# Patient Record
Sex: MALE | Race: WHITE | Employment: UNEMPLOYED | URBAN - METROPOLITAN AREA
[De-identification: names, ages, dates, MRNs, and addresses within clinical notes are randomized per-mention and may not be internally consistent; named-entity substitution may affect disease eponyms.]

---

## 2024-02-13 ENCOUNTER — HOSPITAL ENCOUNTER (EMERGENCY)
Age: 39
Discharge: HOME | End: 2024-02-14
Attending: EMERGENCY MEDICINE

## 2024-02-13 ENCOUNTER — APPOINTMENT (OUTPATIENT)
Dept: CT IMAGING | Age: 39
End: 2024-02-13

## 2024-02-13 ENCOUNTER — APPOINTMENT (OUTPATIENT)
Dept: ULTRASOUND IMAGING | Age: 39
End: 2024-02-13

## 2024-02-13 DIAGNOSIS — N45.1 EPIDIDYMITIS: Primary | ICD-10-CM

## 2024-02-13 LAB
ALBUMIN SERPL-MCNC: 4.5 G/DL (ref 3.5–5)
ALBUMIN/GLOB SERPL: 1.1 (ref 0.4–1.6)
ALP SERPL-CCNC: 87 U/L (ref 50–136)
ALT SERPL-CCNC: 25 U/L (ref 12–65)
ANION GAP SERPL CALC-SCNC: 9 MMOL/L (ref 2–11)
APPEARANCE UR: CLEAR
AST SERPL-CCNC: 19 U/L (ref 15–37)
BASOPHILS # BLD: 0 K/UL (ref 0–0.2)
BASOPHILS NFR BLD: 0 % (ref 0–2)
BILIRUB SERPL-MCNC: 1 MG/DL (ref 0.2–1.1)
BILIRUB UR QL: NEGATIVE
BUN SERPL-MCNC: 7 MG/DL (ref 6–23)
CALCIUM SERPL-MCNC: 10.1 MG/DL (ref 8.3–10.4)
CHLORIDE SERPL-SCNC: 103 MMOL/L (ref 103–113)
CO2 SERPL-SCNC: 19 MMOL/L (ref 21–32)
COLOR UR: NORMAL
CREAT SERPL-MCNC: 1.3 MG/DL (ref 0.8–1.5)
DIFFERENTIAL METHOD BLD: ABNORMAL
EOSINOPHIL # BLD: 0.2 K/UL (ref 0–0.8)
EOSINOPHIL NFR BLD: 1 % (ref 0.5–7.8)
ERYTHROCYTE [DISTWIDTH] IN BLOOD BY AUTOMATED COUNT: 13.3 % (ref 11.9–14.6)
GLOBULIN SER CALC-MCNC: 4.1 G/DL (ref 2.8–4.5)
GLUCOSE SERPL-MCNC: 121 MG/DL (ref 65–100)
GLUCOSE UR STRIP.AUTO-MCNC: NEGATIVE MG/DL
HCT VFR BLD AUTO: 52.7 % (ref 41.1–50.3)
HGB BLD-MCNC: 19.2 G/DL (ref 13.6–17.2)
HGB UR QL STRIP: NEGATIVE
IMM GRANULOCYTES # BLD AUTO: 0.1 K/UL (ref 0–0.5)
IMM GRANULOCYTES NFR BLD AUTO: 1 % (ref 0–5)
INR PPP: 0.9
KETONES UR QL STRIP.AUTO: NEGATIVE MG/DL
LACTATE SERPL-SCNC: 0.9 MMOL/L (ref 0.4–2)
LACTATE SERPL-SCNC: 3.6 MMOL/L (ref 0.4–2)
LEUKOCYTE ESTERASE UR QL STRIP.AUTO: NEGATIVE
LYMPHOCYTES # BLD: 2.1 K/UL (ref 0.5–4.6)
LYMPHOCYTES NFR BLD: 20 % (ref 13–44)
MCH RBC QN AUTO: 34 PG (ref 26.1–32.9)
MCHC RBC AUTO-ENTMCNC: 36.4 G/DL (ref 31.4–35)
MCV RBC AUTO: 93.4 FL (ref 82–102)
MONOCYTES # BLD: 0.6 K/UL (ref 0.1–1.3)
MONOCYTES NFR BLD: 6 % (ref 4–12)
NEUTS SEG # BLD: 7.6 K/UL (ref 1.7–8.2)
NEUTS SEG NFR BLD: 72 % (ref 43–78)
NITRITE UR QL STRIP.AUTO: NEGATIVE
NRBC # BLD: 0 K/UL (ref 0–0.2)
PH UR STRIP: 7 (ref 5–9)
PLATELET # BLD AUTO: 180 K/UL (ref 150–450)
PMV BLD AUTO: 10 FL (ref 9.4–12.3)
POTASSIUM SERPL-SCNC: 3.6 MMOL/L (ref 3.5–5.1)
PROT SERPL-MCNC: 8.6 G/DL (ref 6.3–8.2)
PROT UR STRIP-MCNC: NEGATIVE MG/DL
PROTHROMBIN TIME: 13 SEC (ref 11.3–14.9)
RBC # BLD AUTO: 5.64 M/UL (ref 4.23–5.6)
SODIUM SERPL-SCNC: 131 MMOL/L (ref 136–146)
SP GR UR REFRACTOMETRY: 1.02 (ref 1–1.02)
UROBILINOGEN UR QL STRIP.AUTO: 0.2 EU/DL (ref 0.2–1)
WBC # BLD AUTO: 10.6 K/UL (ref 4.3–11.1)

## 2024-02-13 PROCEDURE — 2580000003 HC RX 258: Performed by: EMERGENCY MEDICINE

## 2024-02-13 PROCEDURE — 74177 CT ABD & PELVIS W/CONTRAST: CPT

## 2024-02-13 PROCEDURE — 6360000004 HC RX CONTRAST MEDICATION: Performed by: EMERGENCY MEDICINE

## 2024-02-13 PROCEDURE — 76870 US EXAM SCROTUM: CPT

## 2024-02-13 PROCEDURE — 96374 THER/PROPH/DIAG INJ IV PUSH: CPT

## 2024-02-13 PROCEDURE — 96375 TX/PRO/DX INJ NEW DRUG ADDON: CPT

## 2024-02-13 PROCEDURE — 81003 URINALYSIS AUTO W/O SCOPE: CPT

## 2024-02-13 PROCEDURE — 87491 CHLMYD TRACH DNA AMP PROBE: CPT

## 2024-02-13 PROCEDURE — 80053 COMPREHEN METABOLIC PANEL: CPT

## 2024-02-13 PROCEDURE — 6370000000 HC RX 637 (ALT 250 FOR IP): Performed by: EMERGENCY MEDICINE

## 2024-02-13 PROCEDURE — 83605 ASSAY OF LACTIC ACID: CPT

## 2024-02-13 PROCEDURE — 87591 N.GONORRHOEAE DNA AMP PROB: CPT

## 2024-02-13 PROCEDURE — 85025 COMPLETE CBC W/AUTO DIFF WBC: CPT

## 2024-02-13 PROCEDURE — 99285 EMERGENCY DEPT VISIT HI MDM: CPT

## 2024-02-13 PROCEDURE — 6360000002 HC RX W HCPCS: Performed by: EMERGENCY MEDICINE

## 2024-02-13 PROCEDURE — 85610 PROTHROMBIN TIME: CPT

## 2024-02-13 RX ORDER — SODIUM CHLORIDE 0.9 % (FLUSH) 0.9 %
10 SYRINGE (ML) INJECTION
Status: DISCONTINUED | OUTPATIENT
Start: 2024-02-13 | End: 2024-02-14 | Stop reason: HOSPADM

## 2024-02-13 RX ORDER — DOXYCYCLINE HYCLATE 100 MG
100 TABLET ORAL 2 TIMES DAILY
Qty: 14 TABLET | Refills: 0 | Status: SHIPPED | OUTPATIENT
Start: 2024-02-13 | End: 2024-02-20

## 2024-02-13 RX ORDER — KETOROLAC TROMETHAMINE 30 MG/ML
30 INJECTION, SOLUTION INTRAMUSCULAR; INTRAVENOUS
Status: COMPLETED | OUTPATIENT
Start: 2024-02-13 | End: 2024-02-13

## 2024-02-13 RX ORDER — IBUPROFEN 800 MG/1
800 TABLET ORAL EVERY 8 HOURS PRN
Qty: 21 TABLET | Refills: 0 | Status: SHIPPED | OUTPATIENT
Start: 2024-02-13

## 2024-02-13 RX ORDER — ONDANSETRON 2 MG/ML
4 INJECTION INTRAMUSCULAR; INTRAVENOUS
Status: COMPLETED | OUTPATIENT
Start: 2024-02-13 | End: 2024-02-13

## 2024-02-13 RX ORDER — MORPHINE SULFATE 4 MG/ML
4 INJECTION, SOLUTION INTRAMUSCULAR; INTRAVENOUS
Status: COMPLETED | OUTPATIENT
Start: 2024-02-13 | End: 2024-02-13

## 2024-02-13 RX ORDER — DOXYCYCLINE HYCLATE 100 MG/1
100 CAPSULE ORAL ONCE
Status: COMPLETED | OUTPATIENT
Start: 2024-02-13 | End: 2024-02-13

## 2024-02-13 RX ORDER — TAMSULOSIN HYDROCHLORIDE 0.4 MG/1
0.4 CAPSULE ORAL
Status: COMPLETED | OUTPATIENT
Start: 2024-02-13 | End: 2024-02-13

## 2024-02-13 RX ORDER — 0.9 % SODIUM CHLORIDE 0.9 %
1000 INTRAVENOUS SOLUTION INTRAVENOUS
Status: COMPLETED | OUTPATIENT
Start: 2024-02-13 | End: 2024-02-13

## 2024-02-13 RX ORDER — 0.9 % SODIUM CHLORIDE 0.9 %
100 INTRAVENOUS SOLUTION INTRAVENOUS
Status: DISCONTINUED | OUTPATIENT
Start: 2024-02-13 | End: 2024-02-14 | Stop reason: HOSPADM

## 2024-02-13 RX ADMIN — KETOROLAC TROMETHAMINE 30 MG: 30 INJECTION, SOLUTION INTRAMUSCULAR; INTRAVENOUS at 19:50

## 2024-02-13 RX ADMIN — IOPAMIDOL 100 ML: 755 INJECTION, SOLUTION INTRAVENOUS at 21:03

## 2024-02-13 RX ADMIN — MORPHINE SULFATE 4 MG: 4 INJECTION INTRAVENOUS at 20:01

## 2024-02-13 RX ADMIN — TAMSULOSIN HYDROCHLORIDE 0.4 MG: 0.4 CAPSULE ORAL at 20:06

## 2024-02-13 RX ADMIN — SODIUM CHLORIDE 1000 ML: 9 INJECTION, SOLUTION INTRAVENOUS at 20:36

## 2024-02-13 RX ADMIN — DOXYCYCLINE HYCLATE 100 MG: 100 CAPSULE ORAL at 20:37

## 2024-02-13 RX ADMIN — ONDANSETRON 4 MG: 2 INJECTION INTRAMUSCULAR; INTRAVENOUS at 20:00

## 2024-02-13 NOTE — ED TRIAGE NOTES
Ambulatory to triage. States bilateral testicular pain that started two days ago. States awoke from nap and pain became severe. States pain 10/10.

## 2024-02-14 VITALS
OXYGEN SATURATION: 98 % | RESPIRATION RATE: 18 BRPM | TEMPERATURE: 98 F | SYSTOLIC BLOOD PRESSURE: 128 MMHG | WEIGHT: 165 LBS | DIASTOLIC BLOOD PRESSURE: 92 MMHG | BODY MASS INDEX: 23.1 KG/M2 | HEART RATE: 98 BPM | HEIGHT: 71 IN

## 2024-02-14 NOTE — ED PROVIDER NOTES
Emergency Department Provider Note       PCP: None, None   Age: 38 y.o.   Sex: male     DISPOSITION Decision To Discharge 02/13/2024 11:57:21 PM       ICD-10-CM    1. Epididymitis  N45.1           Medical Decision Making     Complexity of Problems Addressed:  1 or more acute illnesses that pose a threat to life or bodily function.     Data Reviewed and Analyzed:  I independently ordered and reviewed each unique test.  I reviewed external records: ED visit note from an outside group.     I interpreted the CT Scan CT scan negative for obvious pathology other than perhaps cystitis.  I interpreted the Ultrasound  ultrasound negative for testicular torsion does suggest epididymitis..    Discussion of management or test interpretation.  Patient with severe testicular pain and apparent epididymitis.  Rest of workup negative.  Prior history of opiate withdrawal noted.  Patient not concerned about a one-time dose in the ER.  He will go home with antibiotics and NSAIDs, hold off on any opiate prescriptions.    Risk of Complications and/or Morbidity of Patient Management:  Over the counter drug management performed.  Parental controlled substances given in the ED.  Patient was discharged risks and benefits of hospitalization were considered.  Shared medical decision making was utilized in creating the patients health plan today.        History       38-year-old gentleman presents to the ER with testicle and lower abdominal pain starting 2 days ago.  No fall or injury.  He describes testicular swelling and states he has had no previous history of hernias.  He reports some dysuria and some dribbling incontinence.  No history of urethral discharge.  He has had no fevers or chills or nausea or vomiting.  Pain worse when seated, patient actually diaphoretic at triage standing up leaning forward, while holding onto the back of a chair to tripod his torso.         Review of Systems   Constitutional:  Positive for diaphoresis.

## 2024-02-18 LAB
C TRACH RRNA SPEC QL NAA+PROBE: NEGATIVE
N GONORRHOEA RRNA SPEC QL NAA+PROBE: NEGATIVE
SPECIMEN SOURCE: NORMAL

## 2025-04-15 ENCOUNTER — HOSPITAL ENCOUNTER (INPATIENT)
Age: 40
LOS: 2 days | Discharge: HOME | DRG: 641 | End: 2025-04-17
Attending: INTERNAL MEDICINE | Admitting: INTERNAL MEDICINE

## 2025-04-15 ENCOUNTER — APPOINTMENT (OUTPATIENT)
Dept: ULTRASOUND IMAGING | Age: 40
DRG: 641 | End: 2025-04-15

## 2025-04-15 DIAGNOSIS — B35.6 TINEA CRURIS: ICD-10-CM

## 2025-04-15 DIAGNOSIS — E87.1 HYPONATREMIA: ICD-10-CM

## 2025-04-15 DIAGNOSIS — R36.1 BLOOD IN SEMEN: ICD-10-CM

## 2025-04-15 DIAGNOSIS — E87.29 ALCOHOLIC KETOACIDOSIS: Primary | ICD-10-CM

## 2025-04-15 DIAGNOSIS — I15.9 SECONDARY HYPERTENSION: ICD-10-CM

## 2025-04-15 PROBLEM — R74.8 ELEVATED LIVER ENZYMES: Status: ACTIVE | Noted: 2025-04-15

## 2025-04-15 PROBLEM — D75.1 POLYCYTHEMIA: Status: ACTIVE | Noted: 2025-04-15

## 2025-04-15 PROBLEM — I10 HYPERTENSION: Status: ACTIVE | Noted: 2025-04-15

## 2025-04-15 PROBLEM — E88.89 ALCOHOLIC KETOSIS (HCC): Status: ACTIVE | Noted: 2025-04-15

## 2025-04-15 PROBLEM — E16.2 HYPOGLYCEMIA: Status: ACTIVE | Noted: 2025-04-15

## 2025-04-15 PROBLEM — F10.90 ALCOHOL USE: Status: ACTIVE | Noted: 2025-04-15

## 2025-04-15 PROBLEM — Z72.0 TOBACCO USE: Status: ACTIVE | Noted: 2025-04-15

## 2025-04-15 LAB
ALBUMIN SERPL-MCNC: 4.7 G/DL (ref 3.5–5)
ALBUMIN/GLOB SERPL: 1.3 (ref 1–1.9)
ALP SERPL-CCNC: 75 U/L (ref 40–129)
ALT SERPL-CCNC: 93 U/L (ref 8–55)
AMPHET UR QL SCN: NEGATIVE
ANION GAP SERPL CALC-SCNC: 15 MMOL/L (ref 7–16)
ANION GAP SERPL CALC-SCNC: 23 MMOL/L (ref 7–16)
APPEARANCE UR: CLEAR
AST SERPL-CCNC: 114 U/L (ref 15–37)
BACTERIA URNS QL MICRO: NEGATIVE /HPF
BARBITURATES UR QL SCN: NEGATIVE
BASOPHILS # BLD: 0.04 K/UL (ref 0–0.2)
BASOPHILS NFR BLD: 0.6 % (ref 0–2)
BENZODIAZ UR QL: NEGATIVE
BILIRUB SERPL-MCNC: 0.9 MG/DL (ref 0–1.2)
BILIRUB UR QL: NEGATIVE
BUN SERPL-MCNC: 10 MG/DL (ref 6–23)
BUN SERPL-MCNC: 11 MG/DL (ref 6–23)
CALCIUM SERPL-MCNC: 8.9 MG/DL (ref 8.8–10.2)
CALCIUM SERPL-MCNC: 9.7 MG/DL (ref 8.8–10.2)
CANNABINOIDS UR QL SCN: POSITIVE
CHLORIDE SERPL-SCNC: 91 MMOL/L (ref 98–107)
CHLORIDE SERPL-SCNC: 97 MMOL/L (ref 98–107)
CO2 SERPL-SCNC: 15 MMOL/L (ref 20–29)
CO2 SERPL-SCNC: 19 MMOL/L (ref 20–29)
COCAINE UR QL SCN: NEGATIVE
COLOR UR: ABNORMAL
CREAT SERPL-MCNC: 0.99 MG/DL (ref 0.8–1.3)
CREAT SERPL-MCNC: 1.18 MG/DL (ref 0.8–1.3)
DIFFERENTIAL METHOD BLD: ABNORMAL
EOSINOPHIL # BLD: 0.03 K/UL (ref 0–0.8)
EOSINOPHIL NFR BLD: 0.4 % (ref 0.5–7.8)
EPI CELLS #/AREA URNS HPF: ABNORMAL /HPF
ERYTHROCYTE [DISTWIDTH] IN BLOOD BY AUTOMATED COUNT: 12.6 % (ref 11.9–14.6)
ETHANOL SERPL-MCNC: <11 MG/DL (ref 0–0.08)
GLOBULIN SER CALC-MCNC: 3.7 G/DL (ref 2.3–3.5)
GLUCOSE BLD STRIP.AUTO-MCNC: 130 MG/DL (ref 65–100)
GLUCOSE SERPL-MCNC: 117 MG/DL (ref 70–99)
GLUCOSE SERPL-MCNC: 69 MG/DL (ref 70–99)
GLUCOSE UR STRIP.AUTO-MCNC: NEGATIVE MG/DL
HAV IGM SER QL: NONREACTIVE
HBV CORE IGM SER QL: NONREACTIVE
HBV SURFACE AG SER QL: NONREACTIVE
HCT VFR BLD AUTO: 50.3 % (ref 41.1–50.3)
HCV AB SER QL: NONREACTIVE
HGB BLD-MCNC: 18.3 G/DL (ref 13.6–17.2)
HGB UR QL STRIP: NEGATIVE
HIV 1+2 AB+HIV1 P24 AG SERPL QL IA: NONREACTIVE
HIV 1/2 RESULT COMMENT: NORMAL
HYALINE CASTS URNS QL MICRO: ABNORMAL /LPF
IMM GRANULOCYTES # BLD AUTO: 0.04 K/UL (ref 0–0.5)
IMM GRANULOCYTES NFR BLD AUTO: 0.6 % (ref 0–5)
KETONES UR QL STRIP.AUTO: 40 MG/DL
LEUKOCYTE ESTERASE UR QL STRIP.AUTO: NEGATIVE
LYMPHOCYTES # BLD: 1.14 K/UL (ref 0.5–4.6)
LYMPHOCYTES NFR BLD: 15.7 % (ref 13–44)
MCH RBC QN AUTO: 34.7 PG (ref 26.1–32.9)
MCHC RBC AUTO-ENTMCNC: 36.4 G/DL (ref 31.4–35)
MCV RBC AUTO: 95.4 FL (ref 82–102)
METHADONE UR QL: NEGATIVE
MONOCYTES # BLD: 0.57 K/UL (ref 0.1–1.3)
MONOCYTES NFR BLD: 7.8 % (ref 4–12)
NEUTS SEG # BLD: 5.45 K/UL (ref 1.7–8.2)
NEUTS SEG NFR BLD: 74.9 % (ref 43–78)
NITRITE UR QL STRIP.AUTO: NEGATIVE
NRBC # BLD: 0 K/UL (ref 0–0.2)
OPIATES UR QL: NEGATIVE
OSMOLALITY SERPL: 276 MOSM/KG H2O (ref 275–295)
OSMOLALITY UR: 190 MOSM/KG H2O (ref 50–1400)
PCP UR QL: NEGATIVE
PH UR STRIP: 5.5 (ref 5–9)
PLATELET # BLD AUTO: 151 K/UL (ref 150–450)
PMV BLD AUTO: 9.7 FL (ref 9.4–12.3)
POTASSIUM SERPL-SCNC: 4.4 MMOL/L (ref 3.5–5.1)
POTASSIUM SERPL-SCNC: 4.6 MMOL/L (ref 3.5–5.1)
POTASSIUM SERPL-SCNC: ABNORMAL MMOL/L (ref 3.5–5.1)
PROT SERPL-MCNC: 8.5 G/DL (ref 6.3–8.2)
PROT UR STRIP-MCNC: 30 MG/DL
RBC # BLD AUTO: 5.27 M/UL (ref 4.23–5.6)
RBC #/AREA URNS HPF: ABNORMAL /HPF
SERVICE CMNT-IMP: ABNORMAL
SODIUM SERPL-SCNC: 128 MMOL/L (ref 136–145)
SODIUM SERPL-SCNC: 131 MMOL/L (ref 136–145)
SODIUM UR-SCNC: <20 MMOL/L
SP GR UR REFRACTOMETRY: 1.01 (ref 1–1.02)
TSH W FREE THYROID IF ABNORMAL: 1.56 UIU/ML (ref 0.27–4.2)
UROBILINOGEN UR QL STRIP.AUTO: 0.2 EU/DL (ref 0.2–1)
WBC # BLD AUTO: 7.3 K/UL (ref 4.3–11.1)
WBC URNS QL MICRO: ABNORMAL /HPF

## 2025-04-15 PROCEDURE — 36415 COLL VENOUS BLD VENIPUNCTURE: CPT

## 2025-04-15 PROCEDURE — 80074 ACUTE HEPATITIS PANEL: CPT

## 2025-04-15 PROCEDURE — 83935 ASSAY OF URINE OSMOLALITY: CPT

## 2025-04-15 PROCEDURE — 81001 URINALYSIS AUTO W/SCOPE: CPT

## 2025-04-15 PROCEDURE — 99285 EMERGENCY DEPT VISIT HI MDM: CPT

## 2025-04-15 PROCEDURE — 6370000000 HC RX 637 (ALT 250 FOR IP): Performed by: INTERNAL MEDICINE

## 2025-04-15 PROCEDURE — 84132 ASSAY OF SERUM POTASSIUM: CPT

## 2025-04-15 PROCEDURE — 2580000003 HC RX 258: Performed by: INTERNAL MEDICINE

## 2025-04-15 PROCEDURE — 82962 GLUCOSE BLOOD TEST: CPT

## 2025-04-15 PROCEDURE — 87389 HIV-1 AG W/HIV-1&-2 AB AG IA: CPT

## 2025-04-15 PROCEDURE — 80053 COMPREHEN METABOLIC PANEL: CPT

## 2025-04-15 PROCEDURE — 87491 CHLMYD TRACH DNA AMP PROBE: CPT

## 2025-04-15 PROCEDURE — 84443 ASSAY THYROID STIM HORMONE: CPT

## 2025-04-15 PROCEDURE — 80307 DRUG TEST PRSMV CHEM ANLYZR: CPT

## 2025-04-15 PROCEDURE — 85025 COMPLETE CBC W/AUTO DIFF WBC: CPT

## 2025-04-15 PROCEDURE — 93005 ELECTROCARDIOGRAM TRACING: CPT | Performed by: INTERNAL MEDICINE

## 2025-04-15 PROCEDURE — 1100000000 HC RM PRIVATE

## 2025-04-15 PROCEDURE — 84300 ASSAY OF URINE SODIUM: CPT

## 2025-04-15 PROCEDURE — 6370000000 HC RX 637 (ALT 250 FOR IP): Performed by: PHYSICIAN ASSISTANT

## 2025-04-15 PROCEDURE — 76870 US EXAM SCROTUM: CPT

## 2025-04-15 PROCEDURE — 82077 ASSAY SPEC XCP UR&BREATH IA: CPT

## 2025-04-15 PROCEDURE — 2500000003 HC RX 250 WO HCPCS: Performed by: INTERNAL MEDICINE

## 2025-04-15 PROCEDURE — 83930 ASSAY OF BLOOD OSMOLALITY: CPT

## 2025-04-15 PROCEDURE — 6360000002 HC RX W HCPCS: Performed by: INTERNAL MEDICINE

## 2025-04-15 PROCEDURE — 2580000003 HC RX 258: Performed by: PHYSICIAN ASSISTANT

## 2025-04-15 PROCEDURE — 87591 N.GONORRHOEAE DNA AMP PROB: CPT

## 2025-04-15 RX ORDER — POLYETHYLENE GLYCOL 3350 17 G/17G
17 POWDER, FOR SOLUTION ORAL DAILY PRN
Status: DISCONTINUED | OUTPATIENT
Start: 2025-04-15 | End: 2025-04-17 | Stop reason: HOSPADM

## 2025-04-15 RX ORDER — ACETAMINOPHEN 650 MG/1
650 SUPPOSITORY RECTAL EVERY 6 HOURS PRN
Status: DISCONTINUED | OUTPATIENT
Start: 2025-04-15 | End: 2025-04-17 | Stop reason: HOSPADM

## 2025-04-15 RX ORDER — SODIUM CHLORIDE 0.9 % (FLUSH) 0.9 %
5-40 SYRINGE (ML) INJECTION PRN
Status: DISCONTINUED | OUTPATIENT
Start: 2025-04-15 | End: 2025-04-17 | Stop reason: HOSPADM

## 2025-04-15 RX ORDER — LORAZEPAM 0.5 MG/1
0.5 TABLET ORAL EVERY 4 HOURS PRN
Status: DISCONTINUED | OUTPATIENT
Start: 2025-04-15 | End: 2025-04-15 | Stop reason: SDUPTHER

## 2025-04-15 RX ORDER — POTASSIUM CHLORIDE 7.45 MG/ML
10 INJECTION INTRAVENOUS PRN
Status: DISCONTINUED | OUTPATIENT
Start: 2025-04-15 | End: 2025-04-17 | Stop reason: HOSPADM

## 2025-04-15 RX ORDER — NICOTINE 21 MG/24HR
1 PATCH, TRANSDERMAL 24 HOURS TRANSDERMAL DAILY
Status: DISCONTINUED | OUTPATIENT
Start: 2025-04-15 | End: 2025-04-15 | Stop reason: SDUPTHER

## 2025-04-15 RX ORDER — SODIUM CHLORIDE 0.9 % (FLUSH) 0.9 %
5-40 SYRINGE (ML) INJECTION EVERY 12 HOURS SCHEDULED
Status: DISCONTINUED | OUTPATIENT
Start: 2025-04-15 | End: 2025-04-16 | Stop reason: SDUPTHER

## 2025-04-15 RX ORDER — LORAZEPAM 1 MG/1
1 TABLET ORAL
Status: DISCONTINUED | OUTPATIENT
Start: 2025-04-15 | End: 2025-04-17 | Stop reason: HOSPADM

## 2025-04-15 RX ORDER — SODIUM CHLORIDE 9 MG/ML
INJECTION, SOLUTION INTRAVENOUS PRN
Status: DISCONTINUED | OUTPATIENT
Start: 2025-04-15 | End: 2025-04-17 | Stop reason: HOSPADM

## 2025-04-15 RX ORDER — POTASSIUM CHLORIDE 1500 MG/1
40 TABLET, EXTENDED RELEASE ORAL PRN
Status: DISCONTINUED | OUTPATIENT
Start: 2025-04-15 | End: 2025-04-17 | Stop reason: HOSPADM

## 2025-04-15 RX ORDER — ONDANSETRON 4 MG/1
4 TABLET, ORALLY DISINTEGRATING ORAL EVERY 8 HOURS PRN
Status: DISCONTINUED | OUTPATIENT
Start: 2025-04-15 | End: 2025-04-17 | Stop reason: HOSPADM

## 2025-04-15 RX ORDER — NICOTINE 21 MG/24HR
1 PATCH, TRANSDERMAL 24 HOURS TRANSDERMAL DAILY
Status: DISCONTINUED | OUTPATIENT
Start: 2025-04-15 | End: 2025-04-17 | Stop reason: HOSPADM

## 2025-04-15 RX ORDER — LORAZEPAM 1 MG/1
1 TABLET ORAL
Status: COMPLETED | OUTPATIENT
Start: 2025-04-15 | End: 2025-04-15

## 2025-04-15 RX ORDER — MAGNESIUM SULFATE IN WATER 40 MG/ML
2000 INJECTION, SOLUTION INTRAVENOUS PRN
Status: DISCONTINUED | OUTPATIENT
Start: 2025-04-15 | End: 2025-04-17 | Stop reason: HOSPADM

## 2025-04-15 RX ORDER — 0.9 % SODIUM CHLORIDE 0.9 %
1000 INTRAVENOUS SOLUTION INTRAVENOUS ONCE
Status: COMPLETED | OUTPATIENT
Start: 2025-04-15 | End: 2025-04-15

## 2025-04-15 RX ORDER — LANOLIN ALCOHOL/MO/W.PET/CERES
100 CREAM (GRAM) TOPICAL DAILY
Status: DISCONTINUED | OUTPATIENT
Start: 2025-04-15 | End: 2025-04-17 | Stop reason: HOSPADM

## 2025-04-15 RX ORDER — SODIUM CHLORIDE 9 MG/ML
INJECTION, SOLUTION INTRAVENOUS CONTINUOUS
Status: ACTIVE | OUTPATIENT
Start: 2025-04-15 | End: 2025-04-16

## 2025-04-15 RX ORDER — LABETALOL HYDROCHLORIDE 5 MG/ML
10 INJECTION, SOLUTION INTRAVENOUS EVERY 6 HOURS PRN
Status: DISCONTINUED | OUTPATIENT
Start: 2025-04-15 | End: 2025-04-17 | Stop reason: HOSPADM

## 2025-04-15 RX ORDER — LORAZEPAM 0.5 MG/1
0.5 TABLET ORAL EVERY 4 HOURS PRN
Status: DISCONTINUED | OUTPATIENT
Start: 2025-04-15 | End: 2025-04-17 | Stop reason: HOSPADM

## 2025-04-15 RX ORDER — ONDANSETRON 2 MG/ML
4 INJECTION INTRAMUSCULAR; INTRAVENOUS EVERY 6 HOURS PRN
Status: DISCONTINUED | OUTPATIENT
Start: 2025-04-15 | End: 2025-04-17 | Stop reason: HOSPADM

## 2025-04-15 RX ORDER — LORAZEPAM 1 MG/1
3 TABLET ORAL
Status: DISCONTINUED | OUTPATIENT
Start: 2025-04-15 | End: 2025-04-17 | Stop reason: HOSPADM

## 2025-04-15 RX ORDER — ACETAMINOPHEN 325 MG/1
650 TABLET ORAL EVERY 6 HOURS PRN
Status: DISCONTINUED | OUTPATIENT
Start: 2025-04-15 | End: 2025-04-17 | Stop reason: HOSPADM

## 2025-04-15 RX ORDER — LORAZEPAM 1 MG/1
4 TABLET ORAL
Status: DISCONTINUED | OUTPATIENT
Start: 2025-04-15 | End: 2025-04-17 | Stop reason: HOSPADM

## 2025-04-15 RX ORDER — LORAZEPAM 1 MG/1
2 TABLET ORAL
Status: DISCONTINUED | OUTPATIENT
Start: 2025-04-15 | End: 2025-04-17 | Stop reason: HOSPADM

## 2025-04-15 RX ORDER — ENOXAPARIN SODIUM 100 MG/ML
40 INJECTION SUBCUTANEOUS DAILY
Status: DISCONTINUED | OUTPATIENT
Start: 2025-04-16 | End: 2025-04-17 | Stop reason: HOSPADM

## 2025-04-15 RX ORDER — SODIUM CHLORIDE 0.9 % (FLUSH) 0.9 %
5-40 SYRINGE (ML) INJECTION EVERY 12 HOURS SCHEDULED
Status: DISCONTINUED | OUTPATIENT
Start: 2025-04-15 | End: 2025-04-17 | Stop reason: HOSPADM

## 2025-04-15 RX ADMIN — LORAZEPAM 0.5 MG: 0.5 TABLET ORAL at 22:38

## 2025-04-15 RX ADMIN — Medication 100 MG: at 22:18

## 2025-04-15 RX ADMIN — SODIUM CHLORIDE 1000 ML: 0.9 INJECTION, SOLUTION INTRAVENOUS at 18:05

## 2025-04-15 RX ADMIN — LORAZEPAM 1 MG: 1 TABLET ORAL at 17:54

## 2025-04-15 RX ADMIN — LABETALOL HYDROCHLORIDE 10 MG: 5 INJECTION, SOLUTION INTRAVENOUS at 20:04

## 2025-04-15 RX ADMIN — SODIUM CHLORIDE: 0.9 INJECTION, SOLUTION INTRAVENOUS at 22:22

## 2025-04-15 RX ADMIN — Medication 3 MG: at 22:38

## 2025-04-15 RX ADMIN — ACETAMINOPHEN 650 MG: 325 TABLET ORAL at 20:04

## 2025-04-15 RX ADMIN — SODIUM CHLORIDE, PRESERVATIVE FREE 10 ML: 5 INJECTION INTRAVENOUS at 22:13

## 2025-04-15 ASSESSMENT — PAIN DESCRIPTION - LOCATION: LOCATION: PENIS

## 2025-04-15 ASSESSMENT — PAIN - FUNCTIONAL ASSESSMENT
PAIN_FUNCTIONAL_ASSESSMENT: 0-10
PAIN_FUNCTIONAL_ASSESSMENT: 0-10

## 2025-04-15 ASSESSMENT — PAIN SCALES - GENERAL
PAINLEVEL_OUTOF10: 0
PAINLEVEL_OUTOF10: 5

## 2025-04-15 ASSESSMENT — LIFESTYLE VARIABLES
HOW OFTEN DO YOU HAVE A DRINK CONTAINING ALCOHOL: 4 OR MORE TIMES A WEEK
HOW MANY STANDARD DRINKS CONTAINING ALCOHOL DO YOU HAVE ON A TYPICAL DAY: 10 OR MORE

## 2025-04-15 ASSESSMENT — ENCOUNTER SYMPTOMS
RESPIRATORY NEGATIVE: 1
GASTROINTESTINAL NEGATIVE: 1

## 2025-04-15 NOTE — ED PROVIDER NOTES
Emergency Department Provider Note       PCP: None, None   Age: 39 y.o.   Sex: male     DISPOSITION Decision To Admit 04/15/2025 06:49:28 PM    ICD-10-CM    1. Alcoholic ketoacidosis  E87.29       2. Blood in semen  R36.1       3. Tinea cruris  B35.6       4. Hyponatremia  E87.1           Medical Decision Making     39-year-old male presents with dysuria, blood in semen and testicular soreness that started last night.  He tells me that he is very anxious and typically drinks about a 12 pack of beer daily.  Has not had a drink since Sunday.  Tachycardic and hypertensive on arrival.  Given a milligram of Ativan with improvement.  Urine without infection.  Testicular ultrasound without acute abnormalities.  He does have bilateral tinea cruris.  Labs showing hyponatremia and elevated anion gap metabolic acidosis.  Concern for possible alcoholic ketoacidosis.  Hospitalist consulted for admission.  ED Course as of 04/15/25 1852   Tue Apr 15, 2025   1714 Anion Gap(!): 23 [JACKSON]   1714 CARBON DIOXIDE(!): 15 [JACKSON]   1714 Sodium(!): 128 [JACKSON]   1715 Drinks 12 pack beer most days. [JACKSON]      ED Course User Index  [JACKSON] Eunice Cornejo PA     1 or more acute illnesses that pose a threat to life or bodily function.   Shared medical decision making was utilized in creating the patients health plan today.  I independently ordered and reviewed each unique test.               The patient was admitted and I have discussed patient management with the admitting provider.          History     39-year-old male presents with dysuria, blood in semen, and testicular soreness that started last night.  He said he had a little bit of dysuria, but last night when masturbating noticed blood streaks in his semen.  Says he has never had that before.  Has 0 concern for STDs.  He says he has had epididymitis in the past, but at that time he had swollen testicles.  His testicles do not feel swollen, they are just uncomfortable.  No fever nausea vomiting

## 2025-04-15 NOTE — ED NOTES
Pt report and care transferred to JETHRO Ricci at this time.     Adriel Lazaro RN  04/15/25 8881

## 2025-04-16 ENCOUNTER — APPOINTMENT (OUTPATIENT)
Dept: NON INVASIVE DIAGNOSTICS | Age: 40
DRG: 641 | End: 2025-04-16
Attending: INTERNAL MEDICINE

## 2025-04-16 LAB
ALBUMIN SERPL-MCNC: 3.6 G/DL (ref 3.5–5)
ALBUMIN/GLOB SERPL: 1.3 (ref 1–1.9)
ALP SERPL-CCNC: 61 U/L (ref 40–129)
ALT SERPL-CCNC: 63 U/L (ref 8–55)
ANION GAP SERPL CALC-SCNC: 11 MMOL/L (ref 7–16)
AST SERPL-CCNC: 62 U/L (ref 15–37)
BASOPHILS # BLD: 0.03 K/UL (ref 0–0.2)
BASOPHILS NFR BLD: 0.6 % (ref 0–2)
BILIRUB SERPL-MCNC: 0.8 MG/DL (ref 0–1.2)
BUN SERPL-MCNC: 10 MG/DL (ref 6–23)
BUN SERPL-MCNC: 7 MG/DL (ref 6–23)
BUN SERPL-MCNC: 8 MG/DL (ref 6–23)
CALCIUM SERPL-MCNC: 8.7 MG/DL (ref 8.8–10.2)
CALCIUM SERPL-MCNC: 8.8 MG/DL (ref 8.8–10.2)
CALCIUM SERPL-MCNC: 8.8 MG/DL (ref 8.8–10.2)
CHLORIDE SERPL-SCNC: 101 MMOL/L (ref 98–107)
CHLORIDE SERPL-SCNC: 102 MMOL/L (ref 98–107)
CHLORIDE SERPL-SCNC: 103 MMOL/L (ref 98–107)
CO2 SERPL-SCNC: 21 MMOL/L (ref 20–29)
CO2 SERPL-SCNC: 21 MMOL/L (ref 20–29)
CO2 SERPL-SCNC: 23 MMOL/L (ref 20–29)
CORTIS BS SERPL-MCNC: 24.1 UG/DL
CREAT SERPL-MCNC: 0.85 MG/DL (ref 0.8–1.3)
CREAT SERPL-MCNC: 0.92 MG/DL (ref 0.8–1.3)
CREAT SERPL-MCNC: 0.92 MG/DL (ref 0.8–1.3)
DIFFERENTIAL METHOD BLD: ABNORMAL
ECHO AO ROOT DIAM: 3.2 CM
ECHO AO ROOT INDEX: 1.52 CM/M2
ECHO AV AREA PEAK VELOCITY: 2.8 CM2
ECHO AV AREA VTI: 3.2 CM2
ECHO AV AREA/BSA PEAK VELOCITY: 1.3 CM2/M2
ECHO AV AREA/BSA VTI: 1.5 CM2/M2
ECHO AV MEAN GRADIENT: 3 MMHG
ECHO AV MEAN VELOCITY: 0.9 M/S
ECHO AV PEAK GRADIENT: 5 MMHG
ECHO AV PEAK GRADIENT: 5 MMHG
ECHO AV PEAK VELOCITY: 1.1 M/S
ECHO AV VELOCITY RATIO: 0.91
ECHO AV VTI: 18.5 CM
ECHO BSA: 2.13 M2
ECHO IVC PROX: 1.4 CM
ECHO LA AREA 2C: 18.1 CM2
ECHO LA AREA 4C: 14.8 CM2
ECHO LA DIAMETER INDEX: 1.56 CM/M2
ECHO LA DIAMETER: 3.3 CM
ECHO LA MAJOR AXIS: 4.4 CM
ECHO LA MINOR AXIS: 4.8 CM
ECHO LA TO AORTIC ROOT RATIO: 1.03
ECHO LA VOL BP: 45 ML (ref 18–58)
ECHO LA VOL MOD A2C: 55 ML (ref 18–58)
ECHO LA VOL MOD A4C: 35 ML (ref 18–58)
ECHO LA VOL/BSA BIPLANE: 21 ML/M2 (ref 16–34)
ECHO LA VOLUME INDEX MOD A2C: 26 ML/M2 (ref 16–34)
ECHO LA VOLUME INDEX MOD A4C: 17 ML/M2 (ref 16–34)
ECHO LV E' LATERAL VELOCITY: 8.38 CM/S
ECHO LV E' SEPTAL VELOCITY: 7.07 CM/S
ECHO LV EDV A2C: 129 ML
ECHO LV EDV A4C: 128 ML
ECHO LV EDV INDEX A4C: 61 ML/M2
ECHO LV EDV NDEX A2C: 61 ML/M2
ECHO LV EF PHYSICIAN: 56 %
ECHO LV EJECTION FRACTION A2C: 52 %
ECHO LV EJECTION FRACTION A4C: 59 %
ECHO LV EJECTION FRACTION BIPLANE: 56 % (ref 55–100)
ECHO LV ESV A2C: 63 ML
ECHO LV ESV A4C: 53 ML
ECHO LV ESV INDEX A2C: 30 ML/M2
ECHO LV ESV INDEX A4C: 25 ML/M2
ECHO LV FRACTIONAL SHORTENING: 28 % (ref 28–44)
ECHO LV INTERNAL DIMENSION DIASTOLE INDEX: 2.37 CM/M2
ECHO LV INTERNAL DIMENSION DIASTOLIC: 5 CM (ref 4.2–5.9)
ECHO LV INTERNAL DIMENSION SYSTOLIC INDEX: 1.71 CM/M2
ECHO LV INTERNAL DIMENSION SYSTOLIC: 3.6 CM
ECHO LV IVSD: 1.1 CM (ref 0.6–1)
ECHO LV MASS 2D: 194.4 G (ref 88–224)
ECHO LV MASS INDEX 2D: 92.1 G/M2 (ref 49–115)
ECHO LV POSTERIOR WALL DIASTOLIC: 1 CM (ref 0.6–1)
ECHO LV RELATIVE WALL THICKNESS RATIO: 0.4
ECHO LVOT AREA: 3.1 CM2
ECHO LVOT AV VTI INDEX: 1.02
ECHO LVOT DIAM: 2 CM
ECHO LVOT MEAN GRADIENT: 3 MMHG
ECHO LVOT PEAK GRADIENT: 4 MMHG
ECHO LVOT PEAK VELOCITY: 1 M/S
ECHO LVOT STROKE VOLUME INDEX: 28.1 ML/M2
ECHO LVOT SV: 59.3 ML
ECHO LVOT VTI: 18.9 CM
ECHO MV A VELOCITY: 1.03 M/S
ECHO MV AREA VTI: 2.3 CM2
ECHO MV E DECELERATION TIME (DT): 221 MS
ECHO MV E VELOCITY: 0.84 M/S
ECHO MV E/A RATIO: 0.82
ECHO MV E/E' LATERAL: 10.02
ECHO MV E/E' RATIO (AVERAGED): 10.95
ECHO MV E/E' SEPTAL: 11.88
ECHO MV LVOT VTI INDEX: 1.34
ECHO MV MAX VELOCITY: 1.1 M/S
ECHO MV MEAN GRADIENT: 2 MMHG
ECHO MV MEAN VELOCITY: 0.7 M/S
ECHO MV PEAK GRADIENT: 5 MMHG
ECHO MV VTI: 25.3 CM
ECHO PV ACCELERATION TIME (AT): 85 MS
ECHO PV MAX VELOCITY: 0.9 M/S
ECHO PV PEAK GRADIENT: 3 MMHG
ECHO RV BASAL DIMENSION: 3.6 CM
ECHO RV FREE WALL PEAK S': 11.2 CM/S
ECHO RV TAPSE: 1.6 CM (ref 1.7–?)
EKG ATRIAL RATE: 94 BPM
EKG DIAGNOSIS: NORMAL
EKG P AXIS: 60 DEGREES
EKG P-R INTERVAL: 132 MS
EKG Q-T INTERVAL: 358 MS
EKG QRS DURATION: 98 MS
EKG QTC CALCULATION (BAZETT): 448 MS
EKG R AXIS: 69 DEGREES
EKG T AXIS: 21 DEGREES
EKG VENTRICULAR RATE: 94 BPM
EOSINOPHIL # BLD: 0.1 K/UL (ref 0–0.8)
EOSINOPHIL NFR BLD: 2.1 % (ref 0.5–7.8)
ERYTHROCYTE [DISTWIDTH] IN BLOOD BY AUTOMATED COUNT: 12.5 % (ref 11.9–14.6)
GLOBULIN SER CALC-MCNC: 2.8 G/DL (ref 2.3–3.5)
GLUCOSE SERPL-MCNC: 108 MG/DL (ref 70–99)
GLUCOSE SERPL-MCNC: 113 MG/DL (ref 70–99)
GLUCOSE SERPL-MCNC: 158 MG/DL (ref 70–99)
HCT VFR BLD AUTO: 42.3 % (ref 41.1–50.3)
HGB BLD-MCNC: 15.3 G/DL (ref 13.6–17.2)
IMM GRANULOCYTES # BLD AUTO: 0.02 K/UL (ref 0–0.5)
IMM GRANULOCYTES NFR BLD AUTO: 0.4 % (ref 0–5)
LYMPHOCYTES # BLD: 1.48 K/UL (ref 0.5–4.6)
LYMPHOCYTES NFR BLD: 31.8 % (ref 13–44)
MCH RBC QN AUTO: 34.6 PG (ref 26.1–32.9)
MCHC RBC AUTO-ENTMCNC: 36.2 G/DL (ref 31.4–35)
MCV RBC AUTO: 95.7 FL (ref 82–102)
MONOCYTES # BLD: 0.42 K/UL (ref 0.1–1.3)
MONOCYTES NFR BLD: 9 % (ref 4–12)
NEUTS SEG # BLD: 2.61 K/UL (ref 1.7–8.2)
NEUTS SEG NFR BLD: 56.1 % (ref 43–78)
NRBC # BLD: 0 K/UL (ref 0–0.2)
PLATELET # BLD AUTO: 122 K/UL (ref 150–450)
PMV BLD AUTO: 9.9 FL (ref 9.4–12.3)
POTASSIUM SERPL-SCNC: 4 MMOL/L (ref 3.5–5.1)
POTASSIUM SERPL-SCNC: 4.1 MMOL/L (ref 3.5–5.1)
POTASSIUM SERPL-SCNC: 4.1 MMOL/L (ref 3.5–5.1)
PROT SERPL-MCNC: 6.4 G/DL (ref 6.3–8.2)
RBC # BLD AUTO: 4.42 M/UL (ref 4.23–5.6)
SODIUM SERPL-SCNC: 133 MMOL/L (ref 136–145)
SODIUM SERPL-SCNC: 135 MMOL/L (ref 136–145)
SODIUM SERPL-SCNC: 136 MMOL/L (ref 136–145)
WBC # BLD AUTO: 4.7 K/UL (ref 4.3–11.1)

## 2025-04-16 PROCEDURE — 36415 COLL VENOUS BLD VENIPUNCTURE: CPT

## 2025-04-16 PROCEDURE — 6360000002 HC RX W HCPCS: Performed by: PHYSICIAN ASSISTANT

## 2025-04-16 PROCEDURE — 6370000000 HC RX 637 (ALT 250 FOR IP): Performed by: INTERNAL MEDICINE

## 2025-04-16 PROCEDURE — 2500000003 HC RX 250 WO HCPCS: Performed by: INTERNAL MEDICINE

## 2025-04-16 PROCEDURE — 6360000002 HC RX W HCPCS: Performed by: INTERNAL MEDICINE

## 2025-04-16 PROCEDURE — 1100000000 HC RM PRIVATE

## 2025-04-16 PROCEDURE — 85025 COMPLETE CBC W/AUTO DIFF WBC: CPT

## 2025-04-16 PROCEDURE — 6360000004 HC RX CONTRAST MEDICATION: Performed by: INTERNAL MEDICINE

## 2025-04-16 PROCEDURE — 93010 ELECTROCARDIOGRAM REPORT: CPT | Performed by: INTERNAL MEDICINE

## 2025-04-16 PROCEDURE — C8929 TTE W OR WO FOL WCON,DOPPLER: HCPCS

## 2025-04-16 PROCEDURE — 99253 IP/OBS CNSLTJ NEW/EST LOW 45: CPT | Performed by: PHYSICIAN ASSISTANT

## 2025-04-16 PROCEDURE — 6370000000 HC RX 637 (ALT 250 FOR IP): Performed by: PHYSICIAN ASSISTANT

## 2025-04-16 PROCEDURE — 82533 TOTAL CORTISOL: CPT

## 2025-04-16 PROCEDURE — 2500000003 HC RX 250 WO HCPCS: Performed by: PHYSICIAN ASSISTANT

## 2025-04-16 PROCEDURE — 93306 TTE W/DOPPLER COMPLETE: CPT | Performed by: INTERNAL MEDICINE

## 2025-04-16 PROCEDURE — 80053 COMPREHEN METABOLIC PANEL: CPT

## 2025-04-16 PROCEDURE — 80048 BASIC METABOLIC PNL TOTAL CA: CPT

## 2025-04-16 PROCEDURE — 2580000003 HC RX 258: Performed by: INTERNAL MEDICINE

## 2025-04-16 RX ORDER — DOXYCYCLINE 100 MG/1
100 CAPSULE ORAL EVERY 12 HOURS SCHEDULED
Status: DISCONTINUED | OUTPATIENT
Start: 2025-04-16 | End: 2025-04-17 | Stop reason: HOSPADM

## 2025-04-16 RX ADMIN — SODIUM CHLORIDE, PRESERVATIVE FREE 10 ML: 5 INJECTION INTRAVENOUS at 07:33

## 2025-04-16 RX ADMIN — Medication 3 MG: at 19:59

## 2025-04-16 RX ADMIN — LORAZEPAM 0.5 MG: 0.5 TABLET ORAL at 19:59

## 2025-04-16 RX ADMIN — SODIUM CHLORIDE: 0.9 INJECTION, SOLUTION INTRAVENOUS at 07:41

## 2025-04-16 RX ADMIN — WATER 1000 MG: 1 INJECTION INTRAMUSCULAR; INTRAVENOUS; SUBCUTANEOUS at 18:29

## 2025-04-16 RX ADMIN — SODIUM CHLORIDE, PRESERVATIVE FREE 10 ML: 5 INJECTION INTRAVENOUS at 20:38

## 2025-04-16 RX ADMIN — SULFUR HEXAFLUORIDE 5 ML: KIT at 11:27

## 2025-04-16 RX ADMIN — LORAZEPAM 0.5 MG: 0.5 TABLET ORAL at 12:29

## 2025-04-16 RX ADMIN — Medication 100 MG: at 07:33

## 2025-04-16 RX ADMIN — ENOXAPARIN SODIUM 40 MG: 100 INJECTION SUBCUTANEOUS at 07:33

## 2025-04-16 RX ADMIN — SODIUM CHLORIDE, PRESERVATIVE FREE 10 ML: 5 INJECTION INTRAVENOUS at 20:00

## 2025-04-16 RX ADMIN — LORAZEPAM 0.5 MG: 0.5 TABLET ORAL at 07:30

## 2025-04-16 RX ADMIN — DOXYCYCLINE HYCLATE 100 MG: 100 CAPSULE ORAL at 19:59

## 2025-04-16 ASSESSMENT — PAIN SCALES - GENERAL: PAINLEVEL_OUTOF10: 0

## 2025-04-16 NOTE — PROGRESS NOTES
4 Eyes Skin Assessment     NAME:  German Rizzo II  YOB: 1985  MEDICAL RECORD NUMBER:  [id number]    The patient is being assessed for  Admission    I agree that at least one RN has performed a thorough Head to Toe Skin Assessment on the patient. ALL assessment sites listed below have been assessed.      Areas assessed by both nurses:    Head, Face, Ears, Shoulders, Back, Chest, Arms, Elbows, Hands, Sacrum. Buttock, Coccyx, Ischium, and Legs. Feet and Heels        Does the Patient have a Wound? No noted wound(s)       Pepito Prevention initiated by RN: Yes  Wound Care Orders initiated by RN: No    Pressure Injury (Stage 3,4, Unstageable, DTI, NWPT, and Complex wounds) if present, place Wound referral order by RN under : No    New Ostomies, if present place, Ostomy referral order under : No     Nurse 1 eSignature: Electronically signed by Marlin Reyes RN on 4/16/25 at 1:32 AM EDT    **SHARE this note so that the co-signing nurse can place an eSignature**    Nurse 2 eSignature: Electronically signed by Gloria Garcia RN on 4/16/25 at 1:33 AM EDT

## 2025-04-16 NOTE — PLAN OF CARE
Problem: Pain  Goal: Verbalizes/displays adequate comfort level or baseline comfort level  4/16/2025 0946 by Cynthia Mansfield RN  Outcome: Progressing  4/16/2025 0946 by Cynthia Mansfield RN  Outcome: Progressing  4/15/2025 2202 by Gloria Garcia RN  Outcome: Progressing     Problem: Cardiovascular - Adult  Goal: Maintains optimal cardiac output and hemodynamic stability  4/16/2025 0946 by Cynthia Mansfield RN  Outcome: Progressing  4/16/2025 0946 by Cynthia Mansfield RN  Outcome: Progressing     Problem: Skin/Tissue Integrity - Adult  Goal: Skin integrity remains intact  4/16/2025 0946 by Cynthia Mansfield RN  Outcome: Progressing  4/16/2025 0946 by Cynthia Mansfield RN  Outcome: Progressing     Problem: Gastrointestinal - Adult  Goal: Minimal or absence of nausea and vomiting  4/16/2025 0946 by Cynthia Mansfield RN  Outcome: Progressing  4/16/2025 0946 by Cynthia Mansfield RN  Outcome: Progressing  Goal: Maintains or returns to baseline bowel function  4/16/2025 0946 by Cynthia Mansfield RN  Outcome: Progressing  4/16/2025 0946 by Cynthia Mansfield RN  Outcome: Progressing     Problem: Infection - Adult  Goal: Absence of infection at discharge  4/16/2025 0946 by Cynthia Mansfield RN  Outcome: Progressing  4/16/2025 0946 by Cynthia Mansfield RN  Outcome: Progressing     Problem: Safety - Adult  Goal: Free from fall injury  Outcome: Progressing     Problem: Chronic Conditions and Co-morbidities  Goal: Patient's chronic conditions and co-morbidity symptoms are monitored and maintained or improved  Outcome: Progressing     Problem: Discharge Planning  Goal: Discharge to home or other facility with appropriate resources  Outcome: Progressing

## 2025-04-16 NOTE — ED NOTES
TRANSFER - OUT REPORT:    Verbal report given to nurse on German Rizzo II  being transferred to [facility] for routine progression of patient care       Report consisted of patient's Situation, Background, Assessment and   Recommendations(SBAR).     Information from the following report(s) Nurse Handoff Report was reviewed with the receiving nurse.    Chi Fall Assessment:    Presents to emergency department  because of falls (Syncope, seizure, or loss of consciousness): No  Age > 70: No  Altered Mental Status, Intoxication with alcohol or substance confusion (Disorientation, impaired judgment, poor safety awaremess, or inability to follow instructions): No  Impaired Mobility: Ambulates or transfers with assistive devices or assistance; Unable to ambulate or transer.: No  Nursing Judgement: No          Lines:   Peripheral IV 04/15/25 Right Cephalic (Active)        Opportunity for questions and clarification was provided.      Patient transported with:  Registered Nurse          Keiry Villarreal, JETHRO  04/15/25 [id number]

## 2025-04-16 NOTE — CARE COORDINATION
38 y/o M admitted with Alcoholic ketosis  Lives with his father and extended family  Independent with ADLs  Does not drive, family will pick him up at discharge.  DME: none      Pt is not insured and does not have a PCP or any type of prescription coverage.  Information on [employer] given. [employer] [facility] packet given and encouraged him to call and become established with them. [employer] application given. Explained how to fill it out and how the program works. Pt verbalized understanding. Pt was agreeable to having Ermias from [facility] speak with him about services to help with his drinking. Ermias notified that pt wants to speak with him.    No other needs identified at this time.  Will continue to follow.    ASSESSMENT NOTE    Attending Physician: Nicho Caputo MD  Admit Problem: Secondary hypertension [I15.9]  Alcoholic ketoacidosis [E87.29]  Tinea cruris [B35.6]  Hyponatremia [E87.1]  Blood in semen [R36.1]  Alcoholic ketosis (HCC) [E88.89]  Date/Time of Admission: 4/15/2025  3:35 PM  Problem List:  Patient Active Problem List   Diagnosis    Alcoholic ketosis (HCC)    Hypertension    Polycythemia    Hyponatremia    Hypoglycemia    Tobacco use    Elevated liver enzymes    Alcohol use    Hematospermia          04/16/25 1354   Service Assessment   Patient Orientation Alert and Oriented   Cognition Alert   History Provided By Patient   Primary Caregiver Self   Support Systems Family Members   Patient's Healthcare Decision Maker is: Patient Declined (Legal Next of Kin Remains as Decision Maker)   PCP Verified by CM No   Prior Functional Level Independent in ADLs/IADLs   Current Functional Level Independent in ADLs/IADLs   Can patient return to prior living arrangement Yes   Ability to make needs known: Good   Family able to assist with home care needs: Yes   Would you like for me to discuss the discharge plan with any other family members/significant others, and if so, who? No   [employer] [facility] Education   CM/YASIR

## 2025-04-16 NOTE — CONSULTS
CONSULT                      Date: 4/16/2025        Patient Name: German Rizzo II     YOB: 1985      Age:  39 y.o.      History of Present Illness     39 y.o.   male  with medical history of :     -HTN, not taking meds   -ETOH use, 12 pack every few days   -tobacco use 1 ppd   -anxiety  -insomnia        Who is evaluated with complaints of bloody semen and sore on penis for 24 hours   No contacts with infections that he is aware   Spot is painful with touch but seems improved in ED   No feer  Some dysuria   Has sore testicles and abdominal pain  No drainage   No prostate issues     Urology consult for hematospermia. Pt reports noticing a tender \"bump\" on ventral penis at mid-shaft 3 days ago. He states the bump resolved after 1-2 days. He reports 2 days ago he noticed a small amount of blood in his semen followed by dysuria. He denies painful ejaculation. Today he states symptoms have completely resolved. States he is voiding spontaneously without dysuria.    Past Medical History     No past medical history on file.     Past Surgical History     No past surgical history on file.     Medications Prior to Admission     Prior to Admission medications    Medication Sig Start Date End Date Taking? Authorizing Provider   ibuprofen (IBU) 800 MG tablet Take 1 tablet by mouth every 8 hours as needed for Pain 2/13/24  Yes Luis Singleton MD        Allergies     Dilaudid [hydromorphone] and Penicillins    Social History     Social History       Tobacco History       Smoking Status  Every Day Smoking Start Date  2000 Current Packs/Day  1 pack/day Average Packs/Day  1 pack/day for 25.3 years (25.3 ttl pk-yrs) Smoking Tobacco Type  Cigarettes since 2000   Pack Year History     Packs/Day From To Years    1 2000  25.3      Smokeless Tobacco Use  Never              Alcohol History       Alcohol Use Status  Yes Drinks/Week  12 Cans of beer per week Amount  12.0 standard drinks of alcohol/wk              Drug Use   epididymitis with rocephin 1g and doxycycline 100mg BID x 10 days.   Signing off, will arrange for outpatient follow up in 3-4 wks.      Electronically signed by LINDY Trinidad on 4/16/25 at 5:57 PM EDT  [facility]    Phone: [phone]

## 2025-04-16 NOTE — PROGRESS NOTES
Hospitalist Progress Note   Admit Date:  4/15/2025  3:35 PM   Name:  German Rizzo II   Age:  39 y.o.  Sex:  male  :  1985   MRN:  [id number]   Room:  [facility]    Presenting/Chief Complaint: Penis Pain     Reason(s) for Admission: Secondary hypertension [I15.9]  Alcoholic ketoacidosis [E87.29]  Tinea cruris [B35.6]  Hyponatremia [E87.1]  Blood in semen [R36.1]  Alcoholic ketosis (HCC) [E88.89]     Hospital Course:   German Rizzo II is a 39 y.o. male with medical history of medication nonadherence, hypertension, alcohol use disorder, tobacco use, anxiety, insomnia, who presented to the ER for dysuria, blood in semen, testicular soreness, anxiety, chest pain.  UA without evidence of infection.  HIV nonreactive.  GC sent from the ER.  Testicular ultrasound with no acute abnormalities.  Labs showed anion gap metabolic acidosis in the setting of alcohol use.  , ALT 93.  EKG showed possible LVH.  ER discussed EKG with cardiology.  ER recommended admission for the above reasons.  TTE shows normal EF 55% with mildly increased LV thickness.    Subjective & 24hr Events:   No overnight events reported. Denied any complaints this morning. Undergoing TTE this morning. GC results pending.       Assessment & Plan:     Principal Problem:  Alcoholic ketosis (HCC)  Anion gap metabolic acidosis  - Resolved on labs this a.m.    Alcohol use disorder  - On [facility] protocol  - Monitor for withdrawal symptoms  - Encourage cessation      Hypertension  -Can be elevated in setting of alcohol withdrawal  - Continue to monitor  - Follow-up outpatient for titration of antihypertensives    Chest pain  - Resolved      Polycythemia  -Resolved      Hyponatremia  -Resolved      Tobacco use  -Encouraged cessation  - Nicotine patch      Elevated liver enzymes  -LFTs downtrending      Hematospermia  -Urology was consulted on admission.  Can follow-up outpatient with urology.  - Follow-up GC results, pending.      Anticipated Discharge  Arrangements:   Anticipate discharge Home in a.m.    PT/OT evals ordered?  Not ordered; patient not expected to need rehab  Diet:  ADULT DIET; Regular  VTE prophylaxis: Lovenox  Code status: Full Code      Non-peripheral Lines and Tubes (if present):          Telemetry (if present):  Cardiac/Telemetry Monitor On: Portable telemetry pack applied        Hospital Problems:  Principal Problem:    Alcoholic ketosis (HCC)  Active Problems:    Hypertension    Polycythemia    Hyponatremia    Hypoglycemia    Tobacco use    Elevated liver enzymes    Alcohol use    Hematospermia  Resolved Problems:    * No resolved hospital problems. *      Objective:   Patient Vitals for the past 24 hrs:   Temp Pulse Resp BP SpO2   04/16/25 1113 98.4 °F (36.9 °C) 81 16 (!) 131/96 96 %   04/16/25 0719 97.9 °F (36.6 °C) 89 16 (!) 135/97 100 %   04/16/25 0321 97.7 °F (36.5 °C) 79 18 (!) 123/96 100 %   04/15/25 2247 98.4 °F (36.9 °C) 90 18 (!) 138/98 98 %   04/15/25 2038 -- 97 19 -- 98 %   04/15/25 2033 -- 86 25 -- 98 %   04/15/25 2028 -- 87 19 137/86 99 %   04/15/25 2023 -- 85 27 -- 98 %   04/15/25 2018 -- -- -- -- 98 %   04/15/25 2013 -- -- -- 133/89 99 %   04/15/25 2006 -- (!) 103 -- -- 98 %   04/15/25 2001 -- -- -- 134/86 98 %   04/15/25 2000 -- -- -- -- 98 %   04/15/25 1956 -- -- -- -- 98 %   04/15/25 1955 -- -- -- -- 98 %   04/15/25 1951 -- -- -- -- 98 %   04/15/25 1950 -- -- -- -- 99 %   04/15/25 1945 -- -- -- (!) 131/113 99 %   04/15/25 1940 -- -- -- -- 98 %   04/15/25 1935 -- -- -- -- 99 %   04/15/25 1930 -- -- -- (!) 141/96 98 %   04/15/25 1810 -- (!) 108 18 (!) 143/103 98 %   04/15/25 1519 98.2 °F (36.8 °C) (!) 120 20 (!) 187/116 100 %       Oxygen Therapy  SpO2: 96 %  Pulse via Oximetry: 94 beats per minute  O2 Device: None (Room air)    Estimated body mass index is 27.98 kg/m² as calculated from the following:    Height as of this encounter: 1.803 m (5' 11\").    Weight as of this encounter: 91 kg (200 lb 9.6 oz).    Intake/Output  5-40 mL  5-40 mL IntraVENous PRN    0.9 % sodium chloride infusion   IntraVENous PRN    potassium chloride (KLOR-CON M) extended release tablet 40 mEq  40 mEq Oral PRN    Or    potassium bicarb-citric acid (EFFER-K) effervescent tablet 40 mEq  40 mEq Oral PRN    Or    potassium chloride 10 mEq/100 mL IVPB (Peripheral Line)  10 mEq IntraVENous PRN    magnesium sulfate 2000 mg in 50 mL IVPB premix  2,000 mg IntraVENous PRN    enoxaparin (LOVENOX) injection 40 mg  40 mg SubCUTAneous Daily    ondansetron (ZOFRAN-ODT) disintegrating tablet 4 mg  4 mg Oral Q8H PRN    Or    ondansetron (ZOFRAN) injection 4 mg  4 mg IntraVENous Q6H PRN    polyethylene glycol (GLYCOLAX) packet 17 g  17 g Oral Daily PRN    acetaminophen (TYLENOL) tablet 650 mg  650 mg Oral Q6H PRN    Or    acetaminophen (TYLENOL) suppository 650 mg  650 mg Rectal Q6H PRN    0.9 % sodium chloride infusion   IntraVENous Continuous    nicotine (NICODERM CQ) 21 MG/24HR 1 patch  1 patch TransDERmal Daily    melatonin tablet 3 mg  3 mg Oral Nightly PRN    LORazepam (ATIVAN) tablet 0.5 mg  0.5 mg Oral Q4H PRN       Signed:  Nicho Caputo MD    Part of this note may have been written by using a voice dictation software.  The note has been proof read but may still contain some grammatical/other typographical errors.

## 2025-04-16 NOTE — H&P
Hospitalist History and Physical   Admit Date:  4/15/2025  3:35 PM   Name:  German Rizzo II   Age:  39 y.o.  Sex:  male  :  1985   MRN:  [id number]   Room:  [place] [address]    Presenting/Chief Complaint: Penis Pain     Reason(s) for Admission: Alcoholic ketosis (HCC) [E88.89]     History of Present Illness:       German Rizzo II is a 39 y.o. male with medical history of :    -HTN, not taking meds   -ETOH use, 12 pack every few days   -tobacco use 1 ppd   -anxiety  -insomnia      Who is evaluated with complaints of bloody semen and sore on penis for 24 hours   No contacts with infections that he is aware   Spot is painful with touch but seems improved in ED   No feer  Some dysuria   Has sore testicles and abdominal pain  No drainage   No prostate issues       US testicles \"    IMPRESSION:  1. Right epididymal head cyst may be related to chronic epididymitis. Correlate.  2. Bilateral small hydroceles with bilateral varicoceles.   \"    UA negative   Urine GC pending     BP elevated   Not taking BP meds and no PCP but has prior HTN     Smokes   Last drank several days ago  Some shakes but anxious   Some THC, no other illicits   ETOH level ok     LFTS elevated     - has been drinking water since penile sore   Anion gap 23 and bicarb 15  BS 69- did eat in ED but had some recent anorexia     HGB 18.3  Snores  No MILY       FULL CODE  Father is German Rizzo Sr, [phone]       Assessment & Plan:     Principal Problem:    Alcoholic ketosis (HCC)  Plan:   Admit remote tele    cc/hr and reassess   Followup BMP   He did eat in ED       Active Problems:    Hypertension  Plan:   As needed labetalol  May need to resume oral chronic antihypertensives         Polycythemia  Plan:   Trend HGB  Is smoker and snores- likely chronic hypoxia    Could check JAK2 and Erythropoietin pending course         Hyponatremia  Plan:     NS 100cc/hr  Trend BMP every 6 hours  Check TSH, cortisol, urine and serum osmolality  Medications:  Current Outpatient Medications   Medication Instructions    ibuprofen (IBU) 800 mg, Oral, EVERY 8 HOURS PRN       I have personally reviewed labs and tests:  Recent Results (from the past 24 hours)   CMP    Collection Time: 04/15/25  4:22 PM   Result Value Ref Range    Sodium 128 (L) 136 - 145 mmol/L    Potassium HEMOLYZED SPECIMEN 3.5 - 5.1 mmol/L    Chloride 91 (L) 98 - 107 mmol/L    CO2 15 (L) 20 - 29 mmol/L    Anion Gap 23 (H) 7 - 16 mmol/L    Glucose 69 (L) 70 - 99 mg/dL    BUN 10 6 - 23 MG/DL    Creatinine 1.18 0.80 - 1.30 MG/DL    Est, Glom Filt Rate 80 >60 ml/min/1.73m2    Calcium 9.7 8.8 - 10.2 MG/DL    Total Bilirubin 0.9 0.0 - 1.2 MG/DL    ALT 93 (H) 8 - 55 U/L     (H) 15 - 37 U/L    Alk Phosphatase 75 40 - 129 U/L    Total Protein 8.5 (H) 6.3 - 8.2 g/dL    Albumin 4.7 3.5 - 5.0 g/dL    Globulin 3.7 (H) 2.3 - 3.5 g/dL    Albumin/Globulin Ratio 1.3 1.0 - 1.9     Urinalysis w rflx microscopic    Collection Time: 04/15/25  4:52 PM   Result Value Ref Range    Color, UA YELLOW/STRAW      Appearance CLEAR      Specific Gravity, UA 1.006 1.001 - 1.023      pH, Urine 5.5 5.0 - 9.0      Protein, UA 30 (A) NEG mg/dL    Glucose, Ur Negative NEG mg/dL    Ketones, Urine 40 (A) NEG mg/dL    Bilirubin, Urine Negative NEG      Blood, Urine Negative NEG      Urobilinogen, Urine 0.2 0.2 - 1.0 EU/dL    Nitrite, Urine Negative NEG      Leukocyte Esterase, Urine Negative NEG      WBC, UA 0-4 U4 /hpf    RBC, UA 0-5 U5 /hpf    Epithelial Cells, UA 0-5 U5 /hpf    BACTERIA, URINE Negative NEG /hpf    Hyaline Casts, UA 0-2 /lpf   CBC with Auto Differential    Collection Time: 04/15/25  5:56 PM   Result Value Ref Range    WBC 7.3 4.3 - 11.1 K/uL    RBC 5.27 4.23 - 5.6 M/uL    Hemoglobin 18.3 (H) 13.6 - 17.2 g/dL    Hematocrit 50.3 41.1 - 50.3 %    MCV 95.4 82 - 102 FL    MCH 34.7 (H) 26.1 - 32.9 PG    MCHC 36.4 (H) 31.4 - 35.0 g/dL    RDW 12.6 11.9 - 14.6 %    Platelets 151 150 - 450 K/uL    MPV 9.7 9.4 - 12.3

## 2025-04-16 NOTE — PROGRESS NOTES
TRANSFER - IN REPORT:    Verbal report received from JETHRO Davison  on German Rizzo II  being received from ED for routine progression of patient care      Report consisted of patient's Situation, Background, Assessment and   Recommendations(SBAR).     Information from the following report(s) Nurse Handoff Report and Index was reviewed with the receiving nurse.    Opportunity for questions and clarification was provided.      Assessment completed upon patient's arrival to unit and care assumed.

## 2025-04-17 ENCOUNTER — TELEPHONE (OUTPATIENT)
Dept: UROLOGY | Age: 40
End: 2025-04-17

## 2025-04-17 VITALS
TEMPERATURE: 98.2 F | HEIGHT: 71 IN | WEIGHT: 200 LBS | DIASTOLIC BLOOD PRESSURE: 99 MMHG | BODY MASS INDEX: 28 KG/M2 | RESPIRATION RATE: 18 BRPM | OXYGEN SATURATION: 99 % | HEART RATE: 120 BPM | SYSTOLIC BLOOD PRESSURE: 143 MMHG

## 2025-04-17 LAB
ALBUMIN SERPL-MCNC: 3.5 G/DL (ref 3.5–5)
ALBUMIN/GLOB SERPL: 1.3 (ref 1–1.9)
ALP SERPL-CCNC: 63 U/L (ref 40–129)
ALT SERPL-CCNC: 64 U/L (ref 8–55)
ANION GAP SERPL CALC-SCNC: 10 MMOL/L (ref 7–16)
AST SERPL-CCNC: 61 U/L (ref 15–37)
BASOPHILS # BLD: 0.04 K/UL (ref 0–0.2)
BASOPHILS NFR BLD: 0.9 % (ref 0–2)
BILIRUB SERPL-MCNC: 0.6 MG/DL (ref 0–1.2)
BUN SERPL-MCNC: 7 MG/DL (ref 6–23)
C TRACH RRNA SPEC QL NAA+PROBE: NEGATIVE
CALCIUM SERPL-MCNC: 8.9 MG/DL (ref 8.8–10.2)
CHLORIDE SERPL-SCNC: 106 MMOL/L (ref 98–107)
CO2 SERPL-SCNC: 22 MMOL/L (ref 20–29)
CREAT SERPL-MCNC: 0.86 MG/DL (ref 0.8–1.3)
DIFFERENTIAL METHOD BLD: ABNORMAL
EOSINOPHIL # BLD: 0.09 K/UL (ref 0–0.8)
EOSINOPHIL NFR BLD: 2.1 % (ref 0.5–7.8)
ERYTHROCYTE [DISTWIDTH] IN BLOOD BY AUTOMATED COUNT: 12.5 % (ref 11.9–14.6)
GLOBULIN SER CALC-MCNC: 2.8 G/DL (ref 2.3–3.5)
GLUCOSE SERPL-MCNC: 102 MG/DL (ref 70–99)
HCT VFR BLD AUTO: 43.6 % (ref 41.1–50.3)
HGB BLD-MCNC: 14.9 G/DL (ref 13.6–17.2)
IMM GRANULOCYTES # BLD AUTO: 0.02 K/UL (ref 0–0.5)
IMM GRANULOCYTES NFR BLD AUTO: 0.5 % (ref 0–5)
LYMPHOCYTES # BLD: 1.21 K/UL (ref 0.5–4.6)
LYMPHOCYTES NFR BLD: 28.7 % (ref 13–44)
MCH RBC QN AUTO: 34.8 PG (ref 26.1–32.9)
MCHC RBC AUTO-ENTMCNC: 34.2 G/DL (ref 31.4–35)
MCV RBC AUTO: 101.9 FL (ref 82–102)
MONOCYTES # BLD: 0.38 K/UL (ref 0.1–1.3)
MONOCYTES NFR BLD: 9 % (ref 4–12)
N GONORRHOEA RRNA SPEC QL NAA+PROBE: NEGATIVE
NEUTS SEG # BLD: 2.48 K/UL (ref 1.7–8.2)
NEUTS SEG NFR BLD: 58.8 % (ref 43–78)
NRBC # BLD: 0 K/UL (ref 0–0.2)
PLATELET # BLD AUTO: 107 K/UL (ref 150–450)
PMV BLD AUTO: 10.3 FL (ref 9.4–12.3)
POTASSIUM SERPL-SCNC: 3.9 MMOL/L (ref 3.5–5.1)
PROT SERPL-MCNC: 6.3 G/DL (ref 6.3–8.2)
RBC # BLD AUTO: 4.28 M/UL (ref 4.23–5.6)
SODIUM SERPL-SCNC: 138 MMOL/L (ref 136–145)
SPECIMEN SOURCE: NORMAL
WBC # BLD AUTO: 4.2 K/UL (ref 4.3–11.1)

## 2025-04-17 PROCEDURE — 36415 COLL VENOUS BLD VENIPUNCTURE: CPT

## 2025-04-17 PROCEDURE — 6370000000 HC RX 637 (ALT 250 FOR IP)

## 2025-04-17 PROCEDURE — 2500000003 HC RX 250 WO HCPCS: Performed by: INTERNAL MEDICINE

## 2025-04-17 PROCEDURE — 6370000000 HC RX 637 (ALT 250 FOR IP): Performed by: PHYSICIAN ASSISTANT

## 2025-04-17 PROCEDURE — 6370000000 HC RX 637 (ALT 250 FOR IP): Performed by: INTERNAL MEDICINE

## 2025-04-17 PROCEDURE — 80053 COMPREHEN METABOLIC PANEL: CPT

## 2025-04-17 PROCEDURE — 6360000002 HC RX W HCPCS: Performed by: INTERNAL MEDICINE

## 2025-04-17 PROCEDURE — 85025 COMPLETE CBC W/AUTO DIFF WBC: CPT

## 2025-04-17 RX ORDER — AMLODIPINE BESYLATE 5 MG/1
5 TABLET ORAL DAILY
Status: DISCONTINUED | OUTPATIENT
Start: 2025-04-17 | End: 2025-04-17 | Stop reason: HOSPADM

## 2025-04-17 RX ORDER — AMLODIPINE BESYLATE 5 MG/1
5 TABLET ORAL DAILY
Qty: 30 TABLET | Refills: 3 | Status: SHIPPED | OUTPATIENT
Start: 2025-04-18 | End: 2025-04-17

## 2025-04-17 RX ORDER — DOXYCYCLINE 100 MG/1
100 CAPSULE ORAL EVERY 12 HOURS SCHEDULED
Qty: 18 CAPSULE | Refills: 0 | Status: SHIPPED
Start: 2025-04-17 | End: 2025-04-17

## 2025-04-17 RX ORDER — DOXYCYCLINE 100 MG/1
100 CAPSULE ORAL EVERY 12 HOURS SCHEDULED
Qty: 18 CAPSULE | Refills: 0 | Status: SHIPPED | OUTPATIENT
Start: 2025-04-17 | End: 2025-04-17

## 2025-04-17 RX ORDER — AMLODIPINE BESYLATE 5 MG/1
5 TABLET ORAL DAILY
Qty: 30 TABLET | Refills: 3 | Status: SHIPPED | OUTPATIENT
Start: 2025-04-18

## 2025-04-17 RX ORDER — AMLODIPINE BESYLATE 5 MG/1
5 TABLET ORAL DAILY
Qty: 30 TABLET | Refills: 3 | Status: SHIPPED
Start: 2025-04-18 | End: 2025-04-17

## 2025-04-17 RX ORDER — DOXYCYCLINE 100 MG/1
100 CAPSULE ORAL EVERY 12 HOURS SCHEDULED
Qty: 18 CAPSULE | Refills: 0 | Status: SHIPPED | OUTPATIENT
Start: 2025-04-17 | End: 2025-04-26

## 2025-04-17 RX ADMIN — Medication 100 MG: at 07:24

## 2025-04-17 RX ADMIN — SODIUM CHLORIDE, PRESERVATIVE FREE 10 ML: 5 INJECTION INTRAVENOUS at 07:21

## 2025-04-17 RX ADMIN — ENOXAPARIN SODIUM 40 MG: 100 INJECTION SUBCUTANEOUS at 07:24

## 2025-04-17 RX ADMIN — AMLODIPINE BESYLATE 5 MG: 5 TABLET ORAL at 07:29

## 2025-04-17 RX ADMIN — LORAZEPAM 0.5 MG: 0.5 TABLET ORAL at 07:24

## 2025-04-17 RX ADMIN — DOXYCYCLINE HYCLATE 100 MG: 100 CAPSULE ORAL at 07:24

## 2025-04-17 NOTE — TELEPHONE ENCOUNTER
Received discharge message \"Pt will need follow up with NP in 3-4 wks for epididymitis. Thanks!\" Called pt but had to LVM.

## 2025-04-17 NOTE — PLAN OF CARE
Problem: Pain  Goal: Verbalizes/displays adequate comfort level or baseline comfort level  Outcome: Adequate for Discharge     Problem: Cardiovascular - Adult  Goal: Maintains optimal cardiac output and hemodynamic stability  Outcome: Adequate for Discharge     Problem: Skin/Tissue Integrity - Adult  Goal: Skin integrity remains intact  Outcome: Adequate for Discharge     Problem: Gastrointestinal - Adult  Goal: Minimal or absence of nausea and vomiting  Outcome: Adequate for Discharge  Goal: Maintains or returns to baseline bowel function  Outcome: Adequate for Discharge     Problem: Infection - Adult  Goal: Absence of infection at discharge  Outcome: Adequate for Discharge     Problem: Safety - Adult  Goal: Free from fall injury  Outcome: Adequate for Discharge     Problem: Chronic Conditions and Co-morbidities  Goal: Patient's chronic conditions and co-morbidity symptoms are monitored and maintained or improved  Outcome: Adequate for Discharge     Problem: Discharge Planning  Goal: Discharge to home or other facility with appropriate resources  Outcome: Adequate for Discharge     Problem: Anxiety  Goal: Will report anxiety at manageable levels  Description: INTERVENTIONS:1. Administer medication as ordered2. Teach and rehearse alternative coping skills3. Provide emotional support with 1:1 interaction with staff  Outcome: Adequate for Discharge

## 2025-04-17 NOTE — CARE COORDINATION
Pt will discharge home today with his family. [facility] application given, filled out and sent to pharmacy. Pt will  meds at [facility] on his way out. Applications to [facility], [facility], and [id number] given. [facility] was contacted to follow up with pt about their services during this hospitalization. No other needs have been identified at this time. Pt is in agreement with this discharge plan.   04/17/25 1319   Services At/After Discharge   Transition of Care Consult (CM Consult) Discharge Planning;Medication Assistance   Services At/After Discharge Community Resources    Resource Information Provided? No   Mode of Transport at Discharge Other (see comment)  (family)   Confirm Follow Up Transport Family   Condition of Participation: Discharge Planning   The Plan for Transition of Care is related to the following treatment goals: Pt to discharge home today. Community resourses given   The Patient and/or Patient Representative was provided with a Choice of Provider? Patient   The Patient and/Or Patient Representative agree with the Discharge Plan? Yes   Freedom of Choice list was provided with basic dialogue that supports the patient's individualized plan of care/goals, treatment preferences, and shares the quality data associated with the providers?  Yes

## 2025-04-17 NOTE — DISCHARGE SUMMARY
04/15/25  1622 04/16/25  0349 04/17/25  0522   BILITOT 0.9 0.8 0.6   ALKPHOS 75 61 63   * 62* 61*   ALT 93* 63* 64*   PROTEIN 8.5* 6.4 6.3   ALBUMIN 4.7 3.6 3.5   GLOB 3.7* 2.8 2.8      Cardiac  No results found for: \"NTPROBNP\", \"TROPHS\"   Coags Lab Results   Component Value Date/Time    PROTIME 13.0 02/13/2024 07:49 PM    INR 0.9 02/13/2024 07:49 PM      A1c No results found for: \"LABA1C\", \"EAG\"   Lipids No results found for: \"CHOL\", \"HDL\", \"CHOLHDLRATIO\", \"TRIG\"   Thyroid  Lab Results   Component Value Date/Time    TSHELE 1.56 04/15/2025 05:56 PM        Most Recent UA Lab Results   Component Value Date/Time    COLORU YELLOW/STRAW 04/15/2025 04:52 PM    APPEARANCE CLEAR 04/15/2025 04:52 PM    PROTEINU 30 04/15/2025 04:52 PM    GLUCOSEU Negative 04/15/2025 04:52 PM    GLUCOSEU Negative 02/13/2024 09:37 PM    KETUA 40 04/15/2025 04:52 PM    BILIRUBINUR Negative 04/15/2025 04:52 PM    BLOODU Negative 04/15/2025 04:52 PM    UROBILINOGEN 0.2 04/15/2025 04:52 PM    NITRU Negative 04/15/2025 04:52 PM    LEUKOCYTESUR Negative 04/15/2025 04:52 PM    WBCUA 0-4 04/15/2025 04:52 PM    RBCUA 0-5 04/15/2025 04:52 PM    BACTERIA Negative 04/15/2025 04:52 PM        Microbiology:  Results       Procedure Component Value Units Date/Time    C.trachomatis N.gonorrhoeae DNA [[id number]] Collected: 04/15/25 1652    Order Status: Completed Specimen: Miscellaneous sample Updated: 04/17/25 0336     Sample Site Urine        Chlamydia trachomatis, DWAINE Negative        N. Gonorrhoeae RNA Negative        Comment: (NOTE)  Performed At: [address] [id number]  Constantin Mcgraw MD Ph:[phone]                 All Labs from Last 24 Hrs:  Recent Results (from the past 24 hours)   Basic Metabolic Panel    Collection Time: 04/16/25  1:39 PM   Result Value Ref Range    Sodium 136 136 - 145 mmol/L    Potassium 4.0 3.5 - 5.1 mmol/L    Chloride 103 98 - 107 mmol/L    CO2 21 20 - 29 mmol/L    Anion Gap 11  7 - 16 mmol/L    Glucose 158 (H) 70 - 99 mg/dL    BUN 7 6 - 23 MG/DL    Creatinine 0.85 0.80 - 1.30 MG/DL    Est, Glom Filt Rate >90 >60 ml/min/1.73m2    Calcium 8.8 8.8 - 10.2 MG/DL   Comprehensive Metabolic Panel    Collection Time: 04/17/25  5:22 AM   Result Value Ref Range    Sodium 138 136 - 145 mmol/L    Potassium 3.9 3.5 - 5.1 mmol/L    Chloride 106 98 - 107 mmol/L    CO2 22 20 - 29 mmol/L    Anion Gap 10 7 - 16 mmol/L    Glucose 102 (H) 70 - 99 mg/dL    BUN 7 6 - 23 MG/DL    Creatinine 0.86 0.80 - 1.30 MG/DL    Est, Glom Filt Rate >90 >60 ml/min/1.73m2    Calcium 8.9 8.8 - 10.2 MG/DL    Total Bilirubin 0.6 0.0 - 1.2 MG/DL    ALT 64 (H) 8 - 55 U/L    AST 61 (H) 15 - 37 U/L    Alk Phosphatase 63 40 - 129 U/L    Total Protein 6.3 6.3 - 8.2 g/dL    Albumin 3.5 3.5 - 5.0 g/dL    Globulin 2.8 2.3 - 3.5 g/dL    Albumin/Globulin Ratio 1.3 1.0 - 1.9     CBC with Auto Differential    Collection Time: 04/17/25  5:22 AM   Result Value Ref Range    WBC 4.2 (L) 4.3 - 11.1 K/uL    RBC 4.28 4.23 - 5.6 M/uL    Hemoglobin 14.9 13.6 - 17.2 g/dL    Hematocrit 43.6 41.1 - 50.3 %    .9 82 - 102 FL    MCH 34.8 (H) 26.1 - 32.9 PG    MCHC 34.2 31.4 - 35.0 g/dL    RDW 12.5 11.9 - 14.6 %    Platelets 107 (L) 150 - 450 K/uL    MPV 10.3 9.4 - 12.3 FL    nRBC 0.00 0.0 - 0.2 K/uL    Differential Type AUTOMATED      Neutrophils % 58.8 43.0 - 78.0 %    Lymphocytes % 28.7 13.0 - 44.0 %    Monocytes % 9.0 4.0 - 12.0 %    Eosinophils % 2.1 0.5 - 7.8 %    Basophils % 0.9 0.0 - 2.0 %    Immature Granulocytes % 0.5 0.0 - 5.0 %    Neutrophils Absolute 2.48 1.70 - 8.20 K/UL    Lymphocytes Absolute 1.21 0.50 - 4.60 K/UL    Monocytes Absolute 0.38 0.10 - 1.30 K/UL    Eosinophils Absolute 0.09 0.00 - 0.80 K/UL    Basophils Absolute 0.04 0.00 - 0.20 K/UL    Immature Granulocytes Absolute 0.02 0.0 - 0.5 K/UL       No results for input(s): \"COVID19\" in the last 72 hours.    Recent Vital Data:  Patient Vitals for the past 24 hrs:   Temp Pulse Resp

## 2025-04-29 NOTE — PROGRESS NOTES
Physician Progress Note      PATIENT:               TERESA SHERWOOD II  CSN #:                  [id number]  :                       1985  ADMIT DATE:       4/15/2025 3:35 PM  DISCH DATE:        2025 10:25 AM  RESPONDING  PROVIDER #:        Nicho Caputo MD          QUERY TEXT:    Please clarify in documentation the relationship, if any, between  dysuria and epididymitis. Are the conditions:    The clinical indicators include:  -Admitted with complaints of dysuria, blood in semen, testicular soreness  -Hx HTN, ETOH use, tobacco use    -Urology (): \"Recommend treating epididymitis with rocephin 1g and   doxycycline 100mg BID x 10 days.\"    -US Testicles: \"IMPRESSION: 1. Right epididymal head cyst may be related to   chronic epididymitis.  2. Bilateral small hydroceles with bilateral varicoceles.\"    -Urology c/s  -IV Rocephin-->PO Doxycycline per MAR  -Urine Gonorrhea and chlamydia panel--negative per results review  -UA--negative per results review  -lab monitoring  Options provided:  -- Related to or associated with each other  -- Unrelated to each other  -- Other - I will add my own diagnosis  -- Disagree - Not applicable / Not valid  -- Disagree - Clinically unable to determine / Unknown  -- Refer to Clinical Documentation Reviewer    PROVIDER RESPONSE TEXT:    The conditions noted are related to or associated with each other.    Query created by: Akilah Cerrato on 2025 1:37 PM      Electronically signed by:  Nicho Caputo MD 2025 7:04 AM